# Patient Record
Sex: FEMALE | Race: WHITE | Employment: OTHER | URBAN - METROPOLITAN AREA
[De-identification: names, ages, dates, MRNs, and addresses within clinical notes are randomized per-mention and may not be internally consistent; named-entity substitution may affect disease eponyms.]

---

## 2020-05-20 ENCOUNTER — HOSPITAL ENCOUNTER (EMERGENCY)
Facility: CLINIC | Age: 65
Discharge: HOME | End: 2020-05-20
Attending: EMERGENCY MEDICINE | Admitting: EMERGENCY MEDICINE
Payer: COMMERCIAL

## 2020-05-20 ENCOUNTER — NURSE TRIAGE (OUTPATIENT)
Dept: NURSING | Facility: CLINIC | Age: 65
End: 2020-05-20

## 2020-05-20 ENCOUNTER — HOSPITAL ENCOUNTER (EMERGENCY)
Facility: CLINIC | Age: 65
End: 2020-05-20
Payer: COMMERCIAL

## 2020-05-20 ENCOUNTER — OFFICE VISIT (OUTPATIENT)
Dept: URGENT CARE | Facility: URGENT CARE | Age: 65
End: 2020-05-20
Payer: COMMERCIAL

## 2020-05-20 ENCOUNTER — APPOINTMENT (OUTPATIENT)
Dept: ULTRASOUND IMAGING | Facility: CLINIC | Age: 65
End: 2020-05-20
Attending: EMERGENCY MEDICINE
Payer: COMMERCIAL

## 2020-05-20 VITALS
RESPIRATION RATE: 18 BRPM | WEIGHT: 235 LBS | BODY MASS INDEX: 43.24 KG/M2 | HEIGHT: 62 IN | SYSTOLIC BLOOD PRESSURE: 178 MMHG | OXYGEN SATURATION: 97 % | TEMPERATURE: 98.2 F | HEART RATE: 84 BPM | DIASTOLIC BLOOD PRESSURE: 90 MMHG

## 2020-05-20 VITALS
BODY MASS INDEX: 43.24 KG/M2 | SYSTOLIC BLOOD PRESSURE: 138 MMHG | WEIGHT: 235 LBS | OXYGEN SATURATION: 98 % | HEIGHT: 62 IN | HEART RATE: 89 BPM | DIASTOLIC BLOOD PRESSURE: 80 MMHG | TEMPERATURE: 98.4 F | RESPIRATION RATE: 16 BRPM

## 2020-05-20 DIAGNOSIS — M79.89 LEFT LEG SWELLING: Primary | ICD-10-CM

## 2020-05-20 DIAGNOSIS — M79.662 PAIN OF LEFT LOWER LEG: ICD-10-CM

## 2020-05-20 DIAGNOSIS — M25.562 ACUTE PAIN OF LEFT KNEE: ICD-10-CM

## 2020-05-20 DIAGNOSIS — I78.0: ICD-10-CM

## 2020-05-20 DIAGNOSIS — M71.22 BAKER'S CYST OF KNEE, LEFT: ICD-10-CM

## 2020-05-20 DIAGNOSIS — Z82.49 FAMILY HISTORY OF DVT: ICD-10-CM

## 2020-05-20 PROCEDURE — 99284 EMERGENCY DEPT VISIT MOD MDM: CPT | Mod: 25 | Performed by: EMERGENCY MEDICINE

## 2020-05-20 PROCEDURE — 93971 EXTREMITY STUDY: CPT | Mod: LT

## 2020-05-20 PROCEDURE — 99282 EMERGENCY DEPT VISIT SF MDM: CPT | Mod: Z6 | Performed by: EMERGENCY MEDICINE

## 2020-05-20 PROCEDURE — 99205 OFFICE O/P NEW HI 60 MIN: CPT | Performed by: NURSE PRACTITIONER

## 2020-05-20 ASSESSMENT — MIFFLIN-ST. JEOR
SCORE: 1561.26
SCORE: 1561.26

## 2020-05-20 NOTE — TELEPHONE ENCOUNTER
Patient is calling regarding pain in her left leg. The pain is mostly in the back of the left knee and in the front of the lower leg. She has had the pain for approximately 6 days.The pain is usually less in the morning and gets worse during the day. She has no known injury to the leg. She does have some varicose veins. Denies other associated symptoms.      Additional Information    Negative: Looks like a broken bone or dislocated joint (e.g., crooked or deformed)    Negative: Sounds like a life-threatening emergency to the triager    Negative: Followed a hip injury    Negative: Followed a knee injury    Negative: Followed an ankle or foot injury    Negative: Back pain radiating (shooting) into leg(s)    Negative: Foot pain is the main symptom    Negative: Ankle pain is the main symptom    Negative: Knee pain is the main symptom    Negative: Leg swelling is the main symptom    Negative: Chest pain    Negative: Difficulty breathing    Negative: Entire foot is cool or blue in comparison to other side    Negative: Unable to walk    Negative: Fever and red area (or area very tender to touch)    Negative: Fever and swollen joint    Negative: Thigh or calf pain in only one leg and present > 1 hour    Negative: Thigh, calf, or ankle swelling in only one leg    Negative: Thigh, calf, or ankle swelling in both legs, but one side is definitely more swollen    Negative: History of prior 'blood clot' in leg or lungs (i.e., deep vein thrombosis, pulmonary embolism)    Negative: History of inherited increased risk of blood clots (e.g., factor 5 Leiden, antithrombin 3, protein C or protein S deficiency, prothrombin mutation)    Negative: Recent illness requiring prolonged bed rest (immobilization)    Negative: Hip or leg fracture in past 2 months (e.g., or had cast on leg or ankle)    Negative: Major surgery in the past two months    Negative: Cancer treatment in the past two months (or has cancer now)    Negative: Recent  "long-distance travel with prolonged time in car, bus, plane, or train (i.e., within past 2 weeks; 6 or more hours duration)    Negative: Patient sounds very sick or weak to the triager    Negative: SEVERE pain (e.g., excruciating, unable to do any normal activities)    Negative: Cast on leg or ankle and now has increasing pain    Negative: Red area or streak and large (> 2 in. or 5 cm)    Negative: Painful rash with multiple small blisters grouped together (i.e., dermatomal distribution or 'band' or 'stripe')    Negative: Looks like a boil, infected sore, deep ulcer, or other infected rash (spreading redness, pus)    Negative: Localized rash is very painful (no fever)    Negative: Numbness in a leg or foot (i.e., loss of sensation)    Negative: Localized pain, redness or hard lump along vein    Negative: Patient wants to be seen    MODERATE pain (e.g., interferes with normal activities, limping) and present > 3 days    Answer Assessment - Initial Assessment Questions  1. ONSET: \"When did the pain start?\"       6 days ago  2. LOCATION: \"Where is the pain located?\"       Left leg, back side of knee and in the front of shin  3. PAIN: \"How bad is the pain?\"    (Scale 1-10; or mild, moderate, severe)    -  MILD (1-3): doesn't interfere with normal activities     -  MODERATE (4-7): interferes with normal activities (e.g., work or school) or awakens from sleep, limping     -  SEVERE (8-10): excruciating pain, unable to do any normal activities, unable to walk     8 or may be less, worse the more she walks on it  4. WORK OR EXERCISE: \"Has there been any recent work or exercise that involved this part of the body?\"       no  5. CAUSE: \"What do you think is causing the leg pain?\"      no  6. OTHER SYMPTOMS: \"Do you have any other symptoms?\" (e.g., chest pain, back pain, breathing difficulty, swelling, rash, fever, numbness, weakness)      Has varicose veins, no other symptoms  7. PREGNANCY: \"Is there any chance you are " "pregnant?\" \"When was your last menstrual period?\"      no    Protocols used: LEG PAIN-A-OH    Jenifer Sahu RN on 5/20/2020 at 1:57 PM    "

## 2020-05-20 NOTE — ED AVS SNAPSHOT
[facility] Emergency Department  [address] [address]  Phone:  [phone]  Fax:  [phone]                                    Eveline Escobar   MRN: [id number]    Department:  [facility] Emergency Department   Date of Visit:  5/20/2020           After Visit Summary Signature Page    I have received my discharge instructions, and my questions have been answered. I have discussed any challenges I see with this plan with the nurse or doctor.    ..........................................................................................................................................  Patient/Patient Representative Signature      ..........................................................................................................................................  Patient Representative Print Name and Relationship to Patient    ..................................................               ................................................  Date                                   Time    ..........................................................................................................................................  Reviewed by Signature/Title    ...................................................              ..............................................  Date                                               Time          22EPIC Rev 08/18

## 2020-05-20 NOTE — PROGRESS NOTES
"Richardson Escobar is a 64 year old female who presents to clinic today for the following health issues:    HPI     Chief Complaint   Patient presents with     Musculoskeletal Problem     x1 week, left leg pain, radiating to lower leg, gets hot at times, didnt do anything to hurt her leg that she is aware of, worse with movement, she has put heat on it and taken ibuprofen     No injury. Hx of Telangiectasia (see below) and family hx of DVTs due to this hereditary condition.    Patient Active Problem List   Diagnosis     Telangiectasia, hereditary hemorrhagic, of Rendu, Osler and Mccoy (H)     Past Surgical History:   Procedure Laterality Date     ARTHROSCOPY KNEE RT/LT Right      COLONOSCOPY N/A 10/26/2015    Procedure: COLONOSCOPY;  Surgeon: Oliverio Pires MD;  Location: [facility]     ESOPHAGOSCOPY, GASTROSCOPY, DUODENOSCOPY (EGD), COMBINED N/A 10/26/2015    Procedure: COMBINED ESOPHAGOSCOPY, GASTROSCOPY, DUODENOSCOPY (EGD);  Surgeon: Oliverio Pires MD;  Location: [facility]       Social History     Tobacco Use     Smoking status: Never Smoker     Smokeless tobacco: Never Used   Substance Use Topics     Alcohol use: Yes     Family History   Problem Relation Age of Onset     Hypertension Mother      Diabetes Mother      Heart Disease Father      Blood Disease Brother      Blood Disease Sister      Blood Disease Brother      Blood Disease Sister      Blood Disease Sister          No current outpatient medications on file.     Allergies   Allergen Reactions     Nka [No Known Allergies]          Reviewed and updated as needed this visit by Provider  Tobacco  Allergies  Meds  Problems  Med Hx  Surg Hx  Fam Hx         Review of Systems   Constitutional, HEENT, cardiovascular, pulmonary, GI, , musculoskeletal, neuro, skin, endocrine and psych systems are negative, except as otherwise noted.      Objective    /80   Pulse 89   Temp 98.4  F (36.9  C) (Tympanic)   Resp 16   Ht 1.562 m (5' 1.5\")   Wt 106.6 " "kg (235 lb)   SpO2 98%   BMI 43.68 kg/m    Body mass index is 43.68 kg/m .  Physical Exam   GENERAL: healthy, alert and no distress, nontoxic in appearance  EYES: Eyes grossly normal to inspection, PERRL and conjunctivae and sclerae normal  HENT: normocephalic  NECK: supple with full ROM  RESP: lungs clear to auscultation - no rales, rhonchi or wheezes  CV: regular rate and rhythm, normal S1 S2, no S3 or S4, no murmur, click or rub  ABDOMEN: soft, nontender  MS: no gross musculoskeletal defects noted, mild swelling of left lower leg with some tenderness to palpation over medial shin and also has some pain behind left knee starting on lateral side and running across front of knee. No redness.  No rash    Diagnostic Test Results:  Labs reviewed in Epic  No results found for this or any previous visit (from the past 24 hour(s)).        Assessment & Plan  With her hx of Telangiectasia and family hx of DVTs I am sending her to the ER for further evaluation. Alla, charge RN, accepts patient. Pt is worried about blood clot as well.  Problem List Items Addressed This Visit     Telangiectasia, hereditary hemorrhagic, of Rendu, Osler and Mccoy (H)      Other Visit Diagnoses     Left leg swelling    -  Primary    Pain of left lower leg        Family history of DVT                 BMI:   Estimated body mass index is 43.68 kg/m  as calculated from the following:    Height as of this encounter: 1.562 m (5' 1.5\").    Weight as of this encounter: 106.6 kg (235 lb).   Weight management plan: Patient was referred to their PCP to discuss a diet and exercise plan.        Patient Instructions   Go directly to the [place] [facility] for further evaluation. They are expecting you.    No follow-ups on file.    KATI Donnelly [facility]        "

## 2020-05-21 NOTE — ED PROVIDER NOTES
History     Chief Complaint   Patient presents with     Leg Pain     started about a week ago, sent from [facility]     HPI  Eveline Escobar is a 64 year old female who presents for evaluation of approximately 1 week of atraumatic left knee pain.  Pain in the posterolateral and posteromedial aspects the knee, radiating distally and exacerbated by movement of the knee..  No swelling.  No redness or warmth.  Distal CMS function intact.  No shortness of breath, chest pain, cough or hemoptysis.  No prior history of DVT/PE.  DVT/PE risk factors: 2 sisters with DVT, unknown etiology.    Allergies:  Allergies   Allergen Reactions     Nka [No Known Allergies]        Problem List:    Patient Active Problem List    Diagnosis Date Noted     Telangiectasia, hereditary hemorrhagic, of Rendu, Osler and Mccoy (H) 10/15/2015     Priority: Medium        Past Medical History:    No past medical history on file.    Past Surgical History:    Past Surgical History:   Procedure Laterality Date     ARTHROSCOPY KNEE RT/LT Right      COLONOSCOPY N/A 10/26/2015    Procedure: COLONOSCOPY;  Surgeon: Oliverio Pires MD;  Location: [facility]     ESOPHAGOSCOPY, GASTROSCOPY, DUODENOSCOPY (EGD), COMBINED N/A 10/26/2015    Procedure: COMBINED ESOPHAGOSCOPY, GASTROSCOPY, DUODENOSCOPY (EGD);  Surgeon: Oliverio Pires MD;  Location: [facility]       Family History:    Family History   Problem Relation Age of Onset     Hypertension Mother      Diabetes Mother      Heart Disease Father      Blood Disease Brother      Blood Disease Sister      Blood Disease Brother      Blood Disease Sister      Blood Disease Sister        Social History:  Marital Status:   [2]  Social History     Tobacco Use     Smoking status: Never Smoker     Smokeless tobacco: Never Used   Substance Use Topics     Alcohol use: Yes     Drug use: No        Medications:    No current outpatient medications on file.        Review of Systems    Physical Exam   BP: (!) 178/90  Pulse: 84  Temp:  "98.2  F (36.8  C)  Resp: 18  Height: 156.2 cm (5' 1.5\")  Weight: 106.6 kg (235 lb)  SpO2: 97 %      Physical Exam  Musculoskeletal:      Left knee: She exhibits normal range of motion, no swelling, no effusion, no ecchymosis, no erythema, normal alignment, normal patellar mobility and no bony tenderness. Tenderness found.        Legs:          ED Course        Procedures               Results for orders placed or performed during the hospital encounter of 05/20/20 (from the past 24 hour(s))   US Lower Extremity Venous Duplex Left    Narrative    US LOWER EXTREMITY VENOUS DUPLEX LEFT 5/20/2020 6:25 PM    CLINICAL HISTORY: Pain to back of knee.    TECHNIQUE: Venous Duplex ultrasound of the left lower extremity with  and without compression, augmentation and duplex. Color flow and  spectral Doppler with waveform analysis performed.    COMPARISON: None.    FINDINGS: Exam includes the common femoral, femoral, popliteal, and  contralateral common femoral veins as well as segmentally visualized  deep calf veins and greater saphenous vein.     LEFT: No deep vein thrombosis. No superficial thrombophlebitis.  Popliteal cyst measures 2.7 x 0.8 x 1.6 cm.      Impression    IMPRESSION:  1.  No deep venous thrombosis in the left lower extremity.    2.  Baker's cyst left popliteal fossa.    JOE PARRA MD       Medications - No data to display    Assessments & Plan (with Medical Decision Making)     Doubt PE/DVT.  She declined splinting or Ace wrap pain and will Ace wrap at home as needed.  An orthopedic  referral was made for her follow-up.  She was discharged with instructions for supportive care.    I have reviewed the nursing notes.    I have reviewed the findings, diagnosis, plan and need for follow up with the patient.    New Prescriptions    No medications on file       Final diagnoses:   Acute pain of left knee - Atraumatic   Baker's cyst of knee, left       5/20/2020   [facility] EMERGENCY DEPARTMENT   "   Toño Hong MD  05/20/20 1930

## 2020-05-29 ENCOUNTER — OFFICE VISIT (OUTPATIENT)
Dept: ORTHOPEDICS | Facility: CLINIC | Age: 65
End: 2020-05-29
Payer: COMMERCIAL

## 2020-05-29 ENCOUNTER — ANCILLARY PROCEDURE (OUTPATIENT)
Dept: GENERAL RADIOLOGY | Facility: CLINIC | Age: 65
End: 2020-05-29
Attending: FAMILY MEDICINE
Payer: COMMERCIAL

## 2020-05-29 VITALS
WEIGHT: 235 LBS | HEIGHT: 62 IN | SYSTOLIC BLOOD PRESSURE: 122 MMHG | BODY MASS INDEX: 43.24 KG/M2 | DIASTOLIC BLOOD PRESSURE: 78 MMHG

## 2020-05-29 DIAGNOSIS — M25.562 CHRONIC PAIN OF LEFT KNEE: ICD-10-CM

## 2020-05-29 DIAGNOSIS — M17.12 ARTHRITIS OF LEFT KNEE: Primary | ICD-10-CM

## 2020-05-29 DIAGNOSIS — G89.29 CHRONIC PAIN OF LEFT KNEE: ICD-10-CM

## 2020-05-29 DIAGNOSIS — M25.562 LEFT KNEE PAIN: ICD-10-CM

## 2020-05-29 DIAGNOSIS — E66.01 MORBID OBESITY (H): ICD-10-CM

## 2020-05-29 PROCEDURE — 99204 OFFICE O/P NEW MOD 45 MIN: CPT | Performed by: FAMILY MEDICINE

## 2020-05-29 PROCEDURE — 73562 X-RAY EXAM OF KNEE 3: CPT

## 2020-05-29 ASSESSMENT — MIFFLIN-ST. JEOR: SCORE: 1561.26

## 2020-05-29 NOTE — LETTER
5/29/2020         RE: Eveline Escobar  [address]  [facility] [address]        Dear Colleague,    Thank you for referring your patient, Eveline Escobar, to the [facility]. Please see a copy of my visit note below.    ASSESSMENT & PLAN  Eveline was seen today for pain.    Diagnoses and all orders for this visit:    Arthritis of left knee    Chronic pain of left knee  -     XR Knee Standing AP Bilat [place] Bilat Lat Left; Future    Morbid obesity (H)      Patient is a 64 year old female presenting for evaluation of   Chief Complaint   Patient presents with     Left Knee - Pain      # Left Knee Pain: Noted over the past 2 weeks with no acute injury but  noting swelling in leg.  Pt has mild pain around joint line and notable effusion.  Prior US showing no DVT with XR showing mild PF arthrosis.  Flare of this likely cause of pain/swelling.  Counseled patient on nature of condition and treatment options.  Given this plan as below, follow-up as needed  Treatment: activities as tolerated  Physical Therapy HEP given today, if not improved can refer for formal PT  Injection Defer for now, can consider knee aspiration and steroid injection if not improved  Medications  Limited NSAIDs/Tylenol    Concerning signs/sx that would warrant urgent evaluation were discussed.  All questions were answered, patient understands and agrees with plan.      Return if symptoms worsen or fail to improve.    -----    SUBJECTIVE  Eveline Escobar is a/an 64 year old female who is seen in consultation at the request of  Toño Hong M.D. for evaluation of left knee pain. The patient is seen by themselves.    Onset: 2 week(s) ago. Reports insidious onset without acute precipitating event. Seen in UC and ED 5/20/20. US performed.   Location of Pain: left diffuse knee pain   Rating of Pain at worst: 1-2/10  Rating of Pain Currently: 0/10  Worsened by: no specific movements   Better with: ACE wrap    Treatments tried: elevation, ice,  ACE wrap, heat, ibuprofen   Associated symptoms: swelling and tingling on anterior lower leg   Orthopedic history: Hx of Telangiectasia (see below) and family hx of DVTs due to this hereditary condition.  Relevant surgical history: NO  History reviewed. No pertinent past medical history.  Social History     Socioeconomic History     Marital status:      Spouse name: Not on file     Number of children: Not on file     Years of education: Not on file     Highest education level: Not on file   Occupational History     Not on file   Social Needs     Financial resource strain: Not on file     Food insecurity     Worry: Not on file     Inability: Not on file     Transportation needs     Medical: Not on file     Non-medical: Not on file   Tobacco Use     Smoking status: Never Smoker     Smokeless tobacco: Never Used   Substance and Sexual Activity     Alcohol use: Yes     Drug use: No     Sexual activity: Not on file   Lifestyle     Physical activity     Days per week: Not on file     Minutes per session: Not on file     Stress: Not on file   Relationships     Social connections     Talks on phone: Not on file     Gets together: Not on file     Attends Alevism service: Not on file     Active member of club or organization: Not on file     Attends meetings of clubs or organizations: Not on file     Relationship status: Not on file     Intimate partner violence     Fear of current or ex partner: Not on file     Emotionally abused: Not on file     Physically abused: Not on file     Forced sexual activity: Not on file   Other Topics Concern     Parent/sibling w/ CABG, MI or angioplasty before 65F 55M? Not Asked   Social History Narrative     Not on file         Patient's past medical, surgical, social, and family histories were reviewed today and no changes are noted.  Has FO DVT brother/sister/mother    REVIEW OF SYSTEMS:  10 point ROS is negative other than symptoms noted above  "in HPI, Past Medical History or as stated below  Constitutional: NEGATIVE for fever, chills, change in weight  Skin: NEGATIVE for worrisome rashes, moles or lesions  GI/: NEGATIVE for bowel or bladder changes  Neuro: NEGATIVE for weakness, dizziness or paresthesias    OBJECTIVE:  /78   Ht 1.562 m (5' 1.5\")   Wt 106.6 kg (235 lb)   BMI 43.68 kg/m     General: healthy, alert and in no distress  HEENT: no scleral icterus or conjunctival erythema  Skin: no suspicious lesions or rash. No jaundice.  CV: no pedal edema  Resp: normal respiratory effort without conversational dyspnea   Psych: normal mood and affect  Gait: normal steady gait with appropriate coordination and balance  Neuro: Normal light sensory exam of lower extremity  MSK:  LEFT KNEE  Inspection:    normal alignment  Palpation:  Nontender.  Moderate effusion is present    Patellofemoral crepitus is Absent  Range of Motion:     00 extension to 1350 flexion  Strength:    Quadriceps 5/5, hamstrings 5/5, gastrocsoleus 5/5 and tibialis anterior 5/5    Extensor mechanism intact  Special Tests:    Positive: None    Negative: Patellar grind, MCL/valgus stress (0 & 30 deg), LCL/varus stress (0 & 30 deg), Lachman's, anterior drawer, posterior drawer, Arianna's    Independent visualization of the below image:  Recent Results (from the past 24 hour(s))   XR Knee Standing AP Bilat [place] Bilat Lat Left    Narrative    KNEE STANDING AP BILATERAL [place] BILATERAL LATERAL LEFT  5/29/2020  1:54 PM     HISTORY: Left knee pain.    COMPARISON: None.      Impression    IMPRESSION:   Left knee: Mild-to-moderate patellofemoral degenerative changes.  Otherwise unremarkable.    Right knee: Mild lateral patellofemoral degenerative changes.  Otherwise unremarkable.       I  ordered, visualized and reviewed these images with the patient  Mild PF arthrosis, no fracture       Rickie Florez MD, [facility]      Again, thank you for allowing " me to participate in the care of your patient.        Sincerely,        Rickie Florez MD

## 2020-05-29 NOTE — PROGRESS NOTES
ASSESSMENT & PLAN  Eveline was seen today for pain.    Diagnoses and all orders for this visit:    Arthritis of left knee    Chronic pain of left knee  -     XR Knee Standing AP Bilat [place] Bilat Lat Left; Future    Morbid obesity (H)      Patient is a 64 year old female presenting for evaluation of   Chief Complaint   Patient presents with     Left Knee - Pain      # Left Knee Pain: Noted over the past 2 weeks with no acute injury but  noting swelling in leg.  Pt has mild pain around joint line and notable effusion.  Prior US showing no DVT with XR showing mild PF arthrosis.  Flare of this likely cause of pain/swelling.  Counseled patient on nature of condition and treatment options.  Given this plan as below, follow-up as needed  Treatment: activities as tolerated  Physical Therapy HEP given today, if not improved can refer for formal PT  Injection Defer for now, can consider knee aspiration and steroid injection if not improved  Medications  Limited NSAIDs/Tylenol    Concerning signs/sx that would warrant urgent evaluation were discussed.  All questions were answered, patient understands and agrees with plan.      Return if symptoms worsen or fail to improve.    -----    SUBJECTIVE  Eveline Escobar is a/an 64 year old female who is seen in consultation at the request of  Toño Hong M.D. for evaluation of left knee pain. The patient is seen by themselves.    Onset: 2 week(s) ago. Reports insidious onset without acute precipitating event. Seen in UC and ED 5/20/20. US performed.   Location of Pain: left diffuse knee pain   Rating of Pain at worst: 1-2/10  Rating of Pain Currently: 0/10  Worsened by: no specific movements   Better with: ACE wrap   Treatments tried: elevation, ice,  ACE wrap, heat, ibuprofen   Associated symptoms: swelling and tingling on anterior lower leg   Orthopedic history: Hx of Telangiectasia (see below) and family hx of DVTs due to this hereditary condition.  Relevant surgical  history: NO  History reviewed. No pertinent past medical history.  Social History     Socioeconomic History     Marital status:      Spouse name: Not on file     Number of children: Not on file     Years of education: Not on file     Highest education level: Not on file   Occupational History     Not on file   Social Needs     Financial resource strain: Not on file     Food insecurity     Worry: Not on file     Inability: Not on file     Transportation needs     Medical: Not on file     Non-medical: Not on file   Tobacco Use     Smoking status: Never Smoker     Smokeless tobacco: Never Used   Substance and Sexual Activity     Alcohol use: Yes     Drug use: No     Sexual activity: Not on file   Lifestyle     Physical activity     Days per week: Not on file     Minutes per session: Not on file     Stress: Not on file   Relationships     Social connections     Talks on phone: Not on file     Gets together: Not on file     Attends Mu-ism service: Not on file     Active member of club or organization: Not on file     Attends meetings of clubs or organizations: Not on file     Relationship status: Not on file     Intimate partner violence     Fear of current or ex partner: Not on file     Emotionally abused: Not on file     Physically abused: Not on file     Forced sexual activity: Not on file   Other Topics Concern     Parent/sibling w/ CABG, MI or angioplasty before 65F 55M? Not Asked   Social History Narrative     Not on file         Patient's past medical, surgical, social, and family histories were reviewed today and no changes are noted.  Has FO DVT brother/sister/mother    REVIEW OF SYSTEMS:  10 point ROS is negative other than symptoms noted above in HPI, Past Medical History or as stated below  Constitutional: NEGATIVE for fever, chills, change in weight  Skin: NEGATIVE for worrisome rashes, moles or lesions  GI/: NEGATIVE for bowel or bladder changes  Neuro: NEGATIVE for weakness, dizziness or  "paresthesias    OBJECTIVE:  /78   Ht 1.562 m (5' 1.5\")   Wt 106.6 kg (235 lb)   BMI 43.68 kg/m     General: healthy, alert and in no distress  HEENT: no scleral icterus or conjunctival erythema  Skin: no suspicious lesions or rash. No jaundice.  CV: no pedal edema  Resp: normal respiratory effort without conversational dyspnea   Psych: normal mood and affect  Gait: normal steady gait with appropriate coordination and balance  Neuro: Normal light sensory exam of lower extremity  MSK:  LEFT KNEE  Inspection:    normal alignment  Palpation:  Nontender.  Moderate effusion is present    Patellofemoral crepitus is Absent  Range of Motion:     00 extension to 1350 flexion  Strength:    Quadriceps 5/5, hamstrings 5/5, gastrocsoleus 5/5 and tibialis anterior 5/5    Extensor mechanism intact  Special Tests:    Positive: None    Negative: Patellar grind, MCL/valgus stress (0 & 30 deg), LCL/varus stress (0 & 30 deg), Lachman's, anterior drawer, posterior drawer, Arianna's    Independent visualization of the below image:  Recent Results (from the past 24 hour(s))   XR Knee Standing AP Bilat [place] Bilat Lat Left    Narrative    KNEE STANDING AP BILATERAL [place] BILATERAL LATERAL LEFT  5/29/2020  1:54 PM     HISTORY: Left knee pain.    COMPARISON: None.      Impression    IMPRESSION:   Left knee: Mild-to-moderate patellofemoral degenerative changes.  Otherwise unremarkable.    Right knee: Mild lateral patellofemoral degenerative changes.  Otherwise unremarkable.       I  ordered, visualized and reviewed these images with the patient  Mild PF arthrosis, no fracture       Rickie Florez MD, [facility]    "

## 2020-05-29 NOTE — PATIENT INSTRUCTIONS
Diagnosis: Left knee arthritis   Image Findings: mild patellofemoral arthritis, moderate joint effusion  Treatment: Home exercises, can consider knee aspiration and steroid injection as well as PT if not improved  Medications: Limited tylenol/ibuprofen for pain for 1-2 weeks  Follow-up: As needed    It was great seeing you today!    Rickie Florez